# Patient Record
Sex: FEMALE | Race: WHITE | HISPANIC OR LATINO
[De-identification: names, ages, dates, MRNs, and addresses within clinical notes are randomized per-mention and may not be internally consistent; named-entity substitution may affect disease eponyms.]

---

## 2017-02-24 ENCOUNTER — TRANSCRIPTION ENCOUNTER (OUTPATIENT)
Age: 67
End: 2017-02-24

## 2018-01-18 ENCOUNTER — TRANSCRIPTION ENCOUNTER (OUTPATIENT)
Age: 68
End: 2018-01-18

## 2020-07-15 ENCOUNTER — TRANSCRIPTION ENCOUNTER (OUTPATIENT)
Age: 70
End: 2020-07-15

## 2020-07-24 ENCOUNTER — TRANSCRIPTION ENCOUNTER (OUTPATIENT)
Age: 70
End: 2020-07-24

## 2022-12-27 ENCOUNTER — OFFICE (OUTPATIENT)
Dept: URBAN - METROPOLITAN AREA CLINIC 38 | Facility: CLINIC | Age: 72
Setting detail: OPHTHALMOLOGY
End: 2022-12-27
Payer: MEDICARE

## 2022-12-27 ENCOUNTER — TRANSCRIPTION ENCOUNTER (OUTPATIENT)
Age: 72
End: 2022-12-27

## 2022-12-27 DIAGNOSIS — H33.8: ICD-10-CM

## 2022-12-27 PROCEDURE — 92134 CPTRZ OPH DX IMG PST SGM RTA: CPT | Performed by: OPHTHALMOLOGY

## 2022-12-27 PROCEDURE — 92201 OPSCPY EXTND RTA DRAW UNI/BI: CPT | Performed by: OPHTHALMOLOGY

## 2022-12-27 PROCEDURE — 99214 OFFICE O/P EST MOD 30 MIN: CPT | Performed by: OPHTHALMOLOGY

## 2022-12-27 ASSESSMENT — REFRACTION_MANIFEST
OD_SPHERE: +1.75
OS_AXIS: 129
OD_AXIS: 010
OS_SPHERE: -1.50
OD_VA1: 20/400
OU_VA: 20/20
OS_VA1: 20/25
OD_CYLINDER: -0.50
OS_VA1: 20/40-2
OD_CYLINDER: -1.75
OS_CYLINDER: -0.50
OD_VA1: 20/20-2
OD_VA2: 20/20(J1+)
OS_AXIS: 130
OD_ADD: +2.50
OS_ADD: +2.50
OD_SPHERE: PLANO
OS_CYLINDER: -1.00
OS_VA2: 20/20(J1+)
OS_SPHERE: -1.00

## 2022-12-27 ASSESSMENT — REFRACTION_CURRENTRX
OS_OVR_VA: 20/
OS_SPHERE: -2.00
OD_CYLINDER: SPHERE
OD_OVR_VA: 20/
OS_ADD: +2.75
OD_VPRISM_DIRECTION: PROGS
OD_SPHERE: PLANO
OS_AXIS: 144
OD_ADD: +2.75
OS_VPRISM_DIRECTION: PROGS
OS_CYLINDER: -0.75

## 2022-12-27 ASSESSMENT — KERATOMETRY
OD_K2POWER_DIOPTERS: 44.00
OD_AXISANGLE_DEGREES: 102
OS_K1POWER_DIOPTERS: 41.75
OD_K1POWER_DIOPTERS: 42.00
METHOD_AUTO_MANUAL: AUTO
OS_K2POWER_DIOPTERS: 45.00
OS_AXISANGLE_DEGREES: 85

## 2022-12-27 ASSESSMENT — CONFRONTATIONAL VISUAL FIELD TEST (CVF)
OS_FINDINGS: FULL
OD_FINDINGS: FULL

## 2022-12-27 ASSESSMENT — SPHEQUIV_DERIVED
OS_SPHEQUIV: -2
OD_SPHEQUIV: 0.875
OS_SPHEQUIV: -1.25
OS_SPHEQUIV: -2

## 2022-12-27 ASSESSMENT — AXIALLENGTH_DERIVED
OS_AL: 24.4469
OS_AL: 24.4469
OD_AL: 23.4357
OS_AL: 24.1371

## 2022-12-27 ASSESSMENT — TONOMETRY
OS_IOP_MMHG: 12
OD_IOP_MMHG: 12

## 2022-12-27 ASSESSMENT — REFRACTION_AUTOREFRACTION
OS_AXIS: 129
OS_CYLINDER: -1.00
OS_SPHERE: -1.50

## 2022-12-27 ASSESSMENT — VISUAL ACUITY
OS_BCVA: 20/300
OD_BCVA: 20/30

## 2022-12-28 ENCOUNTER — INPATIENT (INPATIENT)
Facility: HOSPITAL | Age: 72
LOS: 0 days | Discharge: HOME | DRG: 115 | End: 2022-12-28
Attending: OPHTHALMOLOGY | Admitting: OPHTHALMOLOGY
Payer: COMMERCIAL

## 2022-12-28 ENCOUNTER — TRANSCRIPTION ENCOUNTER (OUTPATIENT)
Age: 72
End: 2022-12-28

## 2022-12-28 VITALS
HEART RATE: 68 BPM | OXYGEN SATURATION: 96 % | WEIGHT: 156.09 LBS | RESPIRATION RATE: 16 BRPM | SYSTOLIC BLOOD PRESSURE: 153 MMHG | HEIGHT: 69 IN | DIASTOLIC BLOOD PRESSURE: 86 MMHG | TEMPERATURE: 97 F

## 2022-12-28 VITALS
DIASTOLIC BLOOD PRESSURE: 73 MMHG | RESPIRATION RATE: 20 BRPM | SYSTOLIC BLOOD PRESSURE: 106 MMHG | HEART RATE: 72 BPM | OXYGEN SATURATION: 99 %

## 2022-12-28 DIAGNOSIS — Z98.890 OTHER SPECIFIED POSTPROCEDURAL STATES: Chronic | ICD-10-CM

## 2022-12-28 DIAGNOSIS — H33.21 SEROUS RETINAL DETACHMENT, RIGHT EYE: ICD-10-CM

## 2022-12-28 LAB
ALBUMIN SERPL ELPH-MCNC: 3.9 G/DL — SIGNIFICANT CHANGE UP (ref 3.3–5)
ALP SERPL-CCNC: 59 U/L — SIGNIFICANT CHANGE UP (ref 30–120)
ALT FLD-CCNC: 27 U/L DA — SIGNIFICANT CHANGE UP (ref 10–60)
ANION GAP SERPL CALC-SCNC: 5 MMOL/L — SIGNIFICANT CHANGE UP (ref 5–17)
AST SERPL-CCNC: 26 U/L — SIGNIFICANT CHANGE UP (ref 10–40)
BASOPHILS # BLD AUTO: 0.05 K/UL — SIGNIFICANT CHANGE UP (ref 0–0.2)
BASOPHILS NFR BLD AUTO: 0.8 % — SIGNIFICANT CHANGE UP (ref 0–2)
BILIRUB SERPL-MCNC: 1.1 MG/DL — SIGNIFICANT CHANGE UP (ref 0.2–1.2)
BUN SERPL-MCNC: 15 MG/DL — SIGNIFICANT CHANGE UP (ref 7–23)
CALCIUM SERPL-MCNC: 9.4 MG/DL — SIGNIFICANT CHANGE UP (ref 8.4–10.5)
CHLORIDE SERPL-SCNC: 104 MMOL/L — SIGNIFICANT CHANGE UP (ref 96–108)
CO2 SERPL-SCNC: 32 MMOL/L — HIGH (ref 22–31)
CREAT SERPL-MCNC: 0.79 MG/DL — SIGNIFICANT CHANGE UP (ref 0.5–1.3)
EGFR: 78 ML/MIN/1.73M2 — SIGNIFICANT CHANGE UP
EOSINOPHIL # BLD AUTO: 0.21 K/UL — SIGNIFICANT CHANGE UP (ref 0–0.5)
EOSINOPHIL NFR BLD AUTO: 3.3 % — SIGNIFICANT CHANGE UP (ref 0–6)
GLUCOSE SERPL-MCNC: 94 MG/DL — SIGNIFICANT CHANGE UP (ref 70–99)
HCT VFR BLD CALC: 40.6 % — SIGNIFICANT CHANGE UP (ref 34.5–45)
HGB BLD-MCNC: 13.6 G/DL — SIGNIFICANT CHANGE UP (ref 11.5–15.5)
IMM GRANULOCYTES NFR BLD AUTO: 0.2 % — SIGNIFICANT CHANGE UP (ref 0–0.9)
LYMPHOCYTES # BLD AUTO: 1.96 K/UL — SIGNIFICANT CHANGE UP (ref 1–3.3)
LYMPHOCYTES # BLD AUTO: 30.5 % — SIGNIFICANT CHANGE UP (ref 13–44)
MCHC RBC-ENTMCNC: 30.9 PG — SIGNIFICANT CHANGE UP (ref 27–34)
MCHC RBC-ENTMCNC: 33.5 GM/DL — SIGNIFICANT CHANGE UP (ref 32–36)
MCV RBC AUTO: 92.3 FL — SIGNIFICANT CHANGE UP (ref 80–100)
MONOCYTES # BLD AUTO: 0.49 K/UL — SIGNIFICANT CHANGE UP (ref 0–0.9)
MONOCYTES NFR BLD AUTO: 7.6 % — SIGNIFICANT CHANGE UP (ref 2–14)
NEUTROPHILS # BLD AUTO: 3.7 K/UL — SIGNIFICANT CHANGE UP (ref 1.8–7.4)
NEUTROPHILS NFR BLD AUTO: 57.6 % — SIGNIFICANT CHANGE UP (ref 43–77)
NRBC # BLD: 0 /100 WBCS — SIGNIFICANT CHANGE UP (ref 0–0)
PLATELET # BLD AUTO: 203 K/UL — SIGNIFICANT CHANGE UP (ref 150–400)
POTASSIUM SERPL-MCNC: 4.3 MMOL/L — SIGNIFICANT CHANGE UP (ref 3.5–5.3)
POTASSIUM SERPL-SCNC: 4.3 MMOL/L — SIGNIFICANT CHANGE UP (ref 3.5–5.3)
PROT SERPL-MCNC: 7.6 G/DL — SIGNIFICANT CHANGE UP (ref 6–8.3)
RBC # BLD: 4.4 M/UL — SIGNIFICANT CHANGE UP (ref 3.8–5.2)
RBC # FLD: 12 % — SIGNIFICANT CHANGE UP (ref 10.3–14.5)
SARS-COV-2 RNA SPEC QL NAA+PROBE: SIGNIFICANT CHANGE UP
SODIUM SERPL-SCNC: 141 MMOL/L — SIGNIFICANT CHANGE UP (ref 135–145)
WBC # BLD: 6.42 K/UL — SIGNIFICANT CHANGE UP (ref 3.8–10.5)
WBC # FLD AUTO: 6.42 K/UL — SIGNIFICANT CHANGE UP (ref 3.8–10.5)

## 2022-12-28 PROCEDURE — 36415 COLL VENOUS BLD VENIPUNCTURE: CPT

## 2022-12-28 PROCEDURE — 93010 ELECTROCARDIOGRAM REPORT: CPT

## 2022-12-28 PROCEDURE — 99221 1ST HOSP IP/OBS SF/LOW 40: CPT

## 2022-12-28 PROCEDURE — 99285 EMERGENCY DEPT VISIT HI MDM: CPT

## 2022-12-28 PROCEDURE — 87635 SARS-COV-2 COVID-19 AMP PRB: CPT

## 2022-12-28 PROCEDURE — C1784: CPT

## 2022-12-28 PROCEDURE — 85025 COMPLETE CBC W/AUTO DIFF WBC: CPT

## 2022-12-28 PROCEDURE — 93005 ELECTROCARDIOGRAM TRACING: CPT

## 2022-12-28 PROCEDURE — 99285 EMERGENCY DEPT VISIT HI MDM: CPT | Mod: 25

## 2022-12-28 PROCEDURE — 80053 COMPREHEN METABOLIC PANEL: CPT

## 2022-12-28 PROCEDURE — C1889: CPT

## 2022-12-28 PROCEDURE — 67108 REPAIR DETACHED RETINA: CPT | Mod: AS,RT

## 2022-12-28 DEVICE — SLEEVE OVAL STYLE S3083: Type: IMPLANTABLE DEVICE | Site: RIGHT | Status: FUNCTIONAL

## 2022-12-28 DEVICE — STRIP SILICONE STYLE 41: Type: IMPLANTABLE DEVICE | Site: RIGHT | Status: FUNCTIONAL

## 2022-12-28 DEVICE — GS C3F8 PERFLUOROPROPANE IOL 2.5 L 20GM: Type: IMPLANTABLE DEVICE | Site: RIGHT | Status: FUNCTIONAL

## 2022-12-28 DEVICE — PERFLUORON 7ML KIT: Type: IMPLANTABLE DEVICE | Site: RIGHT | Status: FUNCTIONAL

## 2022-12-28 DEVICE — LASER PROBE 23G CONSTELLATION: Type: IMPLANTABLE DEVICE | Site: RIGHT | Status: FUNCTIONAL

## 2022-12-28 RX ORDER — ROSUVASTATIN CALCIUM 5 MG/1
1 TABLET ORAL
Qty: 0 | Refills: 0 | DISCHARGE

## 2022-12-28 NOTE — ASU DISCHARGE PLAN (ADULT/PEDIATRIC) - NS MD DC FALL RISK RISK
For information on Fall & Injury Prevention, visit: [email or web address] OR  [email or web address] OR  https://www.cdc.gov/steadi/patient.html

## 2022-12-28 NOTE — ED ADULT TRIAGE NOTE - CHIEF COMPLAINT QUOTE
" Eye doctor sent me here for eye surgery, right retinal detachment, seeing shadows right eye x 1 week "

## 2022-12-28 NOTE — H&P ADULT - ASSESSMENT
72F HLD recently started on Crestor presents with right eye vision changes with retinal detachment    no medical objection to proceed to OR    pt feels dehydrated, will start IVF while NPO.     RN and daughter at bedside aware of plan.

## 2022-12-28 NOTE — H&P ADULT - NSHPLABSRESULTS_GEN_ALL_CORE
Labs:                          13.6   6.42  )-----------( 203      ( 28 Dec 2022 11:00 )             40.6       12-28    141  |  104  |  15  ----------------------------<  94  4.3   |  32<H>  |  0.79    Ca    9.4      28 Dec 2022 11:00    TPro  7.6  /  Alb  3.9  /  TBili  1.1  /  DBili  x   /  AST  26  /  ALT  27  /  AlkPhos  59  12-28                      Lactate Trend            CAPILLARY BLOOD GLUCOSE          EKG:  NSR  Personally Reviewed:  [x ] YES     Imaging:   Personally Reviewed:  [ ] YES

## 2022-12-28 NOTE — H&P ADULT - NSHPPHYSICALEXAM_GEN_ALL_CORE
PHYSICAL EXAM:  Vital Signs Last 24 Hrs  T(C): 36.4 (28 Dec 2022 12:20), Max: 36.4 (28 Dec 2022 12:19)  T(F): 97.5 (28 Dec 2022 12:20), Max: 97.5 (28 Dec 2022 12:19)  HR: 58 (28 Dec 2022 12:20) (58 - 68)  BP: 126/76 (28 Dec 2022 12:20) (126/76 - 153/86)  BP(mean): --  RR: 15 (28 Dec 2022 12:20) (15 - 16)  SpO2: 99% (28 Dec 2022 12:20) (96% - 99%)    Parameters below as of 28 Dec 2022 12:19  Patient On (Oxygen Delivery Method): room air        GENERAL: NAD, well-groomed, well-developed  HEAD:  Atraumatic, Normocephalic  EYES:  conjunctiva and sclera clear  ENMT: Dry mucous membranes  NECK: Supple, No JVD  NERVOUS SYSTEM:  Alert & Oriented X3, Good concentration; Motor Strength 5/5 B/L upper and lower extremities;  CHEST/LUNG: Clear to auscultation bilaterally; No rales, rhonchi, wheezing, or rubs  HEART: Regular rate and rhythm; No murmurs, rubs, or gallops  ABDOMEN: Soft, Nontender, Nondistended; Bowel sounds present  EXTREMITIES:  2+ Peripheral Pulses, No clubbing, cyanosis, or edema  LYMPH: No lymphadenopathy noted  SKIN: No rashes or lesions

## 2022-12-28 NOTE — ED PROVIDER NOTE - CLINICAL SUMMARY MEDICAL DECISION MAKING FREE TEXT BOX
72-year-old female with history of high cholesterol presents with right eye vision disturbance, superior aspect over past several days.  No known fever or chills.  No recent trauma.  No acute headache.  No eye pain.  No eye redness or swelling.  No numbness/tingling/focal weakness.  No aggravating or alleviating factors otherwise noted.  Patient was seen by ophthalmology, sent to the ER for admission for operative repair of the retinal detachment.  Patient not vaccinated for COVID, no recent exposures.  Exam: Nontoxic, well-appearing.  CTA BL, no W/R/R.  Normal cardiac exam.  Right eye with full range of motion, normal sclera.  No acute edema.  Normal nonfocal neuro exam.  No other acute findings on exam.  Acute right eye retinal detachment, CVA medicine, ophthalmology.

## 2022-12-28 NOTE — ED PROVIDER NOTE - OBJECTIVE STATEMENT
72-year-old female with history of hyperlipidemia presents for repair of right retinal detachment today.  Patient states that she has had blurry vision in her right eye with occasional floaters and flashes x1 week.  Patient was seen by ophthalmologist yesterday and  referred to see a retinal specialist today who advised to come to ED for admission for right retinal detachment surgery. Pt has hx of laser surgery. Denies trauma, fever, chest pain, shortness of breath, headache, dizziness, eye pain or other symptoms.  pcp: Dr. Patel

## 2022-12-28 NOTE — ED ADULT NURSE NOTE - OBJECTIVE STATEMENT
Pt is alert and oriented. Pt states that she was sent here to get surgery for right retinal detachment. Pt states that she woke up with blurriness in the right eye last Thursday. Pt states that the vision in the right eye is blurry and she is seeing shadows. Pt denies sob, chest pain, nausea, vomiting, dizziness and pain. Pt resp are even and unlabored, skin color yolanda for race. Pt updated on plan of care.

## 2022-12-28 NOTE — ASU PATIENT PROFILE, ADULT - FALL HARM RISK - HARM RISK INTERVENTIONS

## 2022-12-28 NOTE — H&P ADULT - HISTORY OF PRESENT ILLNESS
72F HLD recently started on Crestor presents with right eye vision changes.  no other medical history.  No surgical history.  no personal or family history of issues with anesthesia or bleeding.   Normally walks 5 miles a day without cardiac symptoms.  No recent illnesses.

## 2023-10-11 PROBLEM — E78.5 HYPERLIPIDEMIA, UNSPECIFIED: Chronic | Status: ACTIVE | Noted: 2022-12-28

## 2023-11-06 PROBLEM — Z00.00 ENCOUNTER FOR PREVENTIVE HEALTH EXAMINATION: Status: ACTIVE | Noted: 2023-11-06

## 2023-11-08 ENCOUNTER — APPOINTMENT (OUTPATIENT)
Dept: ORTHOPEDIC SURGERY | Facility: CLINIC | Age: 73
End: 2023-11-08
Payer: MEDICARE

## 2023-11-08 DIAGNOSIS — Z78.9 OTHER SPECIFIED HEALTH STATUS: ICD-10-CM

## 2023-11-08 DIAGNOSIS — M17.11 UNILATERAL PRIMARY OSTEOARTHRITIS, RIGHT KNEE: ICD-10-CM

## 2023-11-08 PROCEDURE — 73562 X-RAY EXAM OF KNEE 3: CPT | Mod: RT

## 2023-11-08 PROCEDURE — 99203 OFFICE O/P NEW LOW 30 MIN: CPT

## 2025-07-09 ENCOUNTER — OFFICE (OUTPATIENT)
Dept: URBAN - METROPOLITAN AREA CLINIC 38 | Facility: CLINIC | Age: 75
Setting detail: OPHTHALMOLOGY
End: 2025-07-09
Payer: COMMERCIAL

## 2025-07-09 DIAGNOSIS — H35.3211: ICD-10-CM

## 2025-07-09 DIAGNOSIS — H52.4: ICD-10-CM

## 2025-07-09 DIAGNOSIS — Z96.1: ICD-10-CM

## 2025-07-09 DIAGNOSIS — H31.001: ICD-10-CM

## 2025-07-09 PROBLEM — H35.40 PERIPAPILLARY ATROPHY: Status: ACTIVE | Noted: 2025-07-09

## 2025-07-09 PROBLEM — H52.7 REFRACTIVE ERROR: Status: ACTIVE | Noted: 2025-07-09

## 2025-07-09 PROCEDURE — 92015 DETERMINE REFRACTIVE STATE: CPT | Performed by: OPHTHALMOLOGY

## 2025-07-09 PROCEDURE — 92014 COMPRE OPH EXAM EST PT 1/>: CPT | Performed by: OPHTHALMOLOGY

## 2025-07-09 PROCEDURE — 92250 FUNDUS PHOTOGRAPHY W/I&R: CPT | Performed by: OPHTHALMOLOGY

## 2025-07-09 ASSESSMENT — REFRACTION_MANIFEST
OS_VA2: 20/20(J1+)
OD_SPHERE: -0.75
OD_SPHERE: +1.75
OS_ADD: +2.75
OD_CYLINDER: -0.25
OS_VA1: 20/25
OD_CYLINDER: -1.75
OD_VA2: 20/20(J1+)
OU_VA: 20/25
OD_AXIS: 105
OS_CYLINDER: -1.00
OS_CYLINDER: -1.25
OS_SPHERE: -1.00
OD_AXIS: 010
OD_VA1: 20/400
OS_AXIS: 115
OS_SPHERE: -1.50
OD_VA1: 20/25
OD_ADD: +2.75
OS_VA1: 20/25
OS_AXIS: 129

## 2025-07-09 ASSESSMENT — DECREASING TEAR LAKE - SEVERITY SCORE: OS_DEC_TEARLAKE: 1+

## 2025-07-09 ASSESSMENT — VISUAL ACUITY
OD_BCVA: 20/25
OS_BCVA: 20/25

## 2025-07-09 ASSESSMENT — REFRACTION_CURRENTRX
OS_SPHERE: -1.00
OS_ADD: +2.75
OS_AXIS: 113
OD_VPRISM_DIRECTION: PROGS
OD_CYLINDER: -0.50
OD_ADD: +2.75
OS_CYLINDER: -1.50
OS_VPRISM_DIRECTION: PROGS
OD_SPHERE: -1.00
OD_AXIS: 111
OD_OVR_VA: 20/
OS_OVR_VA: 20/

## 2025-07-09 ASSESSMENT — KERATOMETRY
OD_K2POWER_DIOPTERS: 44.00
OS_K2POWER_DIOPTERS: 45.25
OD_AXISANGLE_DEGREES: 093
OS_K1POWER_DIOPTERS: 41.50
OS_AXISANGLE_DEGREES: 086
METHOD_AUTO_MANUAL: AUTO
OD_K1POWER_DIOPTERS: 41.75

## 2025-07-09 ASSESSMENT — CONFRONTATIONAL VISUAL FIELD TEST (CVF)
OD_FINDINGS: FULL
OS_FINDINGS: FULL

## 2025-07-09 ASSESSMENT — REFRACTION_AUTOREFRACTION
OD_CYLINDER: -0.25
OD_SPHERE: -0.75
OS_SPHERE: -1.00
OS_AXIS: 128
OS_CYLINDER: -1.25
OD_AXIS: 104

## 2025-07-09 ASSESSMENT — TONOMETRY
OD_IOP_MMHG: 11
OS_IOP_MMHG: 11

## 2025-07-09 ASSESSMENT — DRY EYES - PHYSICIAN NOTES: OS_GENERALCOMMENTS: INFERIOR

## 2025-09-17 ENCOUNTER — APPOINTMENT (OUTPATIENT)
Dept: INFECTIOUS DISEASE | Facility: CLINIC | Age: 75
End: 2025-09-17

## (undated) DEVICE — SOL IRR BAL SALT + 500ML

## (undated) DEVICE — CANNULA MEDONE FLEXTIP 25G

## (undated) DEVICE — SUT ETHILON 5-0 18" RD-1

## (undated) DEVICE — POSITIONER FOAM HEAD CRADLE (PINK)

## (undated) DEVICE — LIGHT SHIELD CORNEAL

## (undated) DEVICE — DRSG MASTISOL

## (undated) DEVICE — VENODYNE/SCD SLEEVE CALF MEDIUM

## (undated) DEVICE — AUTO GAS FILL CONSTELLATION

## (undated) DEVICE — PACK VICTRECTOMY RFID 27G PLUS 7500

## (undated) DEVICE — SOL BALANCE SALT 15ML

## (undated) DEVICE — DIATHERMY PROBE 25GA

## (undated) DEVICE — PACK VITRECTOMY

## (undated) DEVICE — DRAPE STERI-DRAPE INCISE 13X13"

## (undated) DEVICE — ELCTR BIPOLAR CORD J&J 12FT DISP

## (undated) DEVICE — SUT MERSILENE 4-0 18" RD-1

## (undated) DEVICE — WARMING BLANKET LOWER ADULT

## (undated) DEVICE — BEAVER BLADE MIN-BLADE ROUNDED TIP 1-SIDE SHARP (GREEN)

## (undated) DEVICE — SYE-LASER - CONSTELLATION MACHINE #2 1003466901X: Type: DURABLE MEDICAL EQUIPMENT

## (undated) DEVICE — SUT VICRYL 6-0 18" S-29 DA

## (undated) DEVICE — SUT VICRYL 8-0 12" TG140-8 DA

## (undated) DEVICE — DRAPE STERI-DRAPE INCISE 3-7/8X4-7/8"

## (undated) DEVICE — SOL IRR POUR H2O 250ML

## (undated) DEVICE — GLV 6.5 PROTEXIS (WHITE)

## (undated) DEVICE — NDL HYPO SAFE 22G X 1.5" (BLACK)

## (undated) DEVICE — CONSTELLATION TOTAL PLUS PAK 23G

## (undated) DEVICE — DRAPE OPHTHALMIC W POUCH

## (undated) DEVICE — SUT PLAIN GUT 6-0 18" TG140-8